# Patient Record
Sex: MALE | Race: WHITE | NOT HISPANIC OR LATINO
[De-identification: names, ages, dates, MRNs, and addresses within clinical notes are randomized per-mention and may not be internally consistent; named-entity substitution may affect disease eponyms.]

---

## 2017-06-13 ENCOUNTER — APPOINTMENT (OUTPATIENT)
Dept: ORTHOPEDIC SURGERY | Facility: CLINIC | Age: 67
End: 2017-06-13

## 2017-06-13 VITALS
WEIGHT: 170 LBS | SYSTOLIC BLOOD PRESSURE: 157 MMHG | HEIGHT: 69 IN | BODY MASS INDEX: 25.18 KG/M2 | DIASTOLIC BLOOD PRESSURE: 82 MMHG | HEART RATE: 60 BPM

## 2017-06-13 DIAGNOSIS — M79.671 PAIN IN RIGHT FOOT: ICD-10-CM

## 2017-06-13 DIAGNOSIS — Z87.891 PERSONAL HISTORY OF NICOTINE DEPENDENCE: ICD-10-CM

## 2017-06-13 DIAGNOSIS — Z86.79 PERSONAL HISTORY OF OTHER DISEASES OF THE CIRCULATORY SYSTEM: ICD-10-CM

## 2017-06-13 DIAGNOSIS — Z87.39 PERSONAL HISTORY OF OTHER DISEASES OF THE MUSCULOSKELETAL SYSTEM AND CONNECTIVE TISSUE: ICD-10-CM

## 2017-06-13 DIAGNOSIS — Z78.9 OTHER SPECIFIED HEALTH STATUS: ICD-10-CM

## 2019-11-22 ENCOUNTER — APPOINTMENT (OUTPATIENT)
Dept: NEUROLOGY | Facility: CLINIC | Age: 69
End: 2019-11-22

## 2019-12-10 ENCOUNTER — APPOINTMENT (OUTPATIENT)
Dept: OPHTHALMOLOGY | Facility: CLINIC | Age: 69
End: 2019-12-10

## 2019-12-12 ENCOUNTER — APPOINTMENT (OUTPATIENT)
Dept: OPHTHALMOLOGY | Facility: CLINIC | Age: 69
End: 2019-12-12
Payer: MEDICARE

## 2019-12-12 ENCOUNTER — NON-APPOINTMENT (OUTPATIENT)
Age: 69
End: 2019-12-12

## 2019-12-12 PROCEDURE — 92004 COMPRE OPH EXAM NEW PT 1/>: CPT

## 2019-12-12 PROCEDURE — 92225: CPT | Mod: RT

## 2019-12-19 ENCOUNTER — APPOINTMENT (OUTPATIENT)
Dept: OPHTHALMOLOGY | Facility: CLINIC | Age: 69
End: 2019-12-19
Payer: MEDICARE

## 2019-12-19 ENCOUNTER — NON-APPOINTMENT (OUTPATIENT)
Age: 69
End: 2019-12-19

## 2019-12-19 PROCEDURE — 92012 INTRM OPH EXAM EST PATIENT: CPT

## 2020-10-27 ENCOUNTER — APPOINTMENT (OUTPATIENT)
Dept: OPHTHALMOLOGY | Facility: CLINIC | Age: 70
End: 2020-10-27
Payer: MEDICARE

## 2020-10-27 ENCOUNTER — NON-APPOINTMENT (OUTPATIENT)
Age: 70
End: 2020-10-27

## 2020-10-27 PROCEDURE — 92012 INTRM OPH EXAM EST PATIENT: CPT

## 2021-04-01 ENCOUNTER — TRANSCRIPTION ENCOUNTER (OUTPATIENT)
Age: 71
End: 2021-04-01

## 2021-12-07 ENCOUNTER — TRANSCRIPTION ENCOUNTER (OUTPATIENT)
Age: 71
End: 2021-12-07

## 2021-12-26 ENCOUNTER — NON-APPOINTMENT (OUTPATIENT)
Age: 71
End: 2021-12-26

## 2021-12-27 ENCOUNTER — APPOINTMENT (OUTPATIENT)
Dept: ORTHOPEDIC SURGERY | Facility: CLINIC | Age: 71
End: 2021-12-27
Payer: MEDICARE

## 2021-12-27 VITALS
HEART RATE: 71 BPM | WEIGHT: 180 LBS | SYSTOLIC BLOOD PRESSURE: 154 MMHG | BODY MASS INDEX: 26.66 KG/M2 | HEIGHT: 69 IN | DIASTOLIC BLOOD PRESSURE: 82 MMHG

## 2021-12-27 DIAGNOSIS — M19.011 PRIMARY OSTEOARTHRITIS, RIGHT SHOULDER: ICD-10-CM

## 2021-12-27 DIAGNOSIS — M25.511 PAIN IN RIGHT SHOULDER: ICD-10-CM

## 2021-12-27 DIAGNOSIS — M19.012 PRIMARY OSTEOARTHRITIS, RIGHT SHOULDER: ICD-10-CM

## 2021-12-27 PROCEDURE — 73030 X-RAY EXAM OF SHOULDER: CPT | Mod: RT

## 2021-12-27 PROCEDURE — 99203 OFFICE O/P NEW LOW 30 MIN: CPT

## 2021-12-27 RX ORDER — ALFUZOSIN HYDROCHLORIDE 10 MG/1
10 TABLET, EXTENDED RELEASE ORAL
Qty: 90 | Refills: 0 | Status: ACTIVE | COMMUNITY
Start: 2021-08-26

## 2021-12-27 RX ORDER — VALSARTAN 320 MG/1
320 TABLET, COATED ORAL
Refills: 0 | Status: ACTIVE | COMMUNITY

## 2021-12-27 RX ORDER — ROSUVASTATIN CALCIUM 10 MG/1
10 TABLET, FILM COATED ORAL
Refills: 0 | Status: ACTIVE | COMMUNITY

## 2021-12-27 RX ORDER — ICOSAPENT ETHYL 1000 MG/1
1 CAPSULE ORAL
Qty: 360 | Refills: 0 | Status: ACTIVE | COMMUNITY
Start: 2021-08-30

## 2021-12-27 RX ORDER — ROSUVASTATIN CALCIUM 10 MG/1
10 TABLET, FILM COATED ORAL
Qty: 90 | Refills: 0 | Status: ACTIVE | COMMUNITY
Start: 2021-12-13

## 2021-12-27 NOTE — PHYSICAL EXAM
[Rad] : radial 2+ and symmetric bilaterally [Normal] : Alert and in no acute distress [Poor Appearance] : well-appearing [Acute Distress] : not in acute distress [Obese] : not obese [de-identified] : The patient has no respiratory distress. Mood and affect are normal. The patient is alert and oriented to person, place and time.\par Examination of the cervical spine demonstrates no tenderness, no deformity and no muscle spasm. Cervical spine rotation is 60° to the right, 60° to the left, 75° of extension and 45° of flexion. Neurologic exam of the upper extremities reveals intact sensation to light touch. Motor function is 5 over 5 in all groups. Deep tendon reflexes are 2+ and equal at the biceps, triceps and brachioradialis.\par Examination of the right shoulder demonstrates tenderness at the acromioclavicular joint.  There is no shoulder instability.  Drop arm test is negative.  Impingement is mildly positive.  Crossarm test is positive.  Belly press test is negative.  He has elevation of 130 degrees on the right compared to 150 on the left.  He has restriction of internal rotation.  The elbow is stable.  There is no lymphedema. [de-identified] : AP, transscapular and axillary x-rays of the right shoulder demonstrate no acute fracture or dislocation.  There are changes at the distal clavicle consistent with prior clavicle fracture which has healed.  There are degenerative changes at the AC joint.  There is a small calcification in the rotator cuff area.

## 2021-12-27 NOTE — DISCUSSION/SUMMARY
[de-identified] : The patient has pain from his right acromioclavicular joint.  I have discussed the pathology, natural history and treatment options with him.  He has degenerative changes with exacerbation.  He is referred for physical therapy.  He will take over-the-counter anti-inflammatories as needed.  Medication risks have been reviewed.  He will be reevaluated in 1 month.

## 2021-12-27 NOTE — HISTORY OF PRESENT ILLNESS
[de-identified] : This 71-year-old right-handed male presents for evaluation of 1 month of right shoulder pain.  He had no injury.  He notes that the pain is worse when he lies on his right side and when he moves his arm across his body.  He has had a history of neck surgery.  He is not having any neck pain.  He denies upper extremity numbness or tingling.  He has had a fracture of his right collarbone in the past.

## 2022-01-27 ENCOUNTER — APPOINTMENT (OUTPATIENT)
Dept: ORTHOPEDIC SURGERY | Facility: CLINIC | Age: 72
End: 2022-01-27
Payer: MEDICARE

## 2022-01-27 VITALS — BODY MASS INDEX: 26.66 KG/M2 | HEIGHT: 69 IN | WEIGHT: 180 LBS

## 2022-01-27 PROCEDURE — 99213 OFFICE O/P EST LOW 20 MIN: CPT

## 2022-01-27 NOTE — PHYSICAL EXAM
[Rad] : radial 2+ and symmetric bilaterally [Normal] : Alert and in no acute distress [Poor Appearance] : well-appearing [Acute Distress] : not in acute distress [Obese] : not obese [de-identified] : The patient has no respiratory distress. Mood and affect are normal. The patient is alert and oriented to person, place and time.\par Examination of the cervical spine demonstrates no tenderness, no deformity and no muscle spasm. Cervical spine rotation is 60° to the right, 60° to the left, 75° of extension and 45° of flexion. Neurologic exam of the upper extremities reveals intact sensation to light touch. Motor function is 5 over 5 in all groups. Deep tendon reflexes are 2+ and equal at the biceps, triceps and brachioradialis.\par Examination of the right shoulder demonstrates tenderness at the acromioclavicular joint.  There is no shoulder instability.  Drop arm test is negative.  Impingement is mildly positive.  Crossarm test is positive.  Belly press test is negative.  He has elevation of 135 degrees on the right compared to 150 on the left.  He has external rotation of 45 degrees right and 60 degrees left.  He has internal rotation to the lower lumbar level on the right and upper lumbar level on the left.  The elbow is stable.  There is no lymphedema.

## 2022-01-27 NOTE — HISTORY OF PRESENT ILLNESS
[de-identified] : 71 year old RHD male with right shoulder AC arthritis presents for reevaluation of right shoulder pain. He has completed 7 sessions of PT. The pain has diminished and his ROM has improved however he is still having discomfort especially with cross body motion. His arm feels weak. He occasionally takes Advil or Aspirin for pain.

## 2022-01-27 NOTE — DISCUSSION/SUMMARY
[de-identified] : The patient has continued right shoulder pain.  He has made some progress with physical therapy but still has some pain and stiffness.  He has mild external rotation weakness.  I recommend he continue his physical therapy program.  He will take ibuprofen as needed for pain.  He will be reevaluated after 1 month.

## 2022-02-20 ENCOUNTER — TRANSCRIPTION ENCOUNTER (OUTPATIENT)
Age: 72
End: 2022-02-20

## 2022-04-23 ENCOUNTER — TRANSCRIPTION ENCOUNTER (OUTPATIENT)
Age: 72
End: 2022-04-23

## 2022-07-24 ENCOUNTER — NON-APPOINTMENT (OUTPATIENT)
Age: 72
End: 2022-07-24

## 2023-04-14 ENCOUNTER — APPOINTMENT (OUTPATIENT)
Dept: ORTHOPEDIC SURGERY | Facility: CLINIC | Age: 73
End: 2023-04-14
Payer: MEDICARE

## 2023-04-14 VITALS
HEART RATE: 69 BPM | DIASTOLIC BLOOD PRESSURE: 81 MMHG | TEMPERATURE: 97.4 F | HEIGHT: 69 IN | OXYGEN SATURATION: 97 % | WEIGHT: 180 LBS | BODY MASS INDEX: 26.66 KG/M2 | SYSTOLIC BLOOD PRESSURE: 135 MMHG

## 2023-04-14 DIAGNOSIS — M19.011 PRIMARY OSTEOARTHRITIS, RIGHT SHOULDER: ICD-10-CM

## 2023-04-14 DIAGNOSIS — S40.011A CONTUSION OF RIGHT SHOULDER, INITIAL ENCOUNTER: ICD-10-CM

## 2023-04-14 DIAGNOSIS — M25.511 PAIN IN RIGHT SHOULDER: ICD-10-CM

## 2023-04-14 PROCEDURE — 73030 X-RAY EXAM OF SHOULDER: CPT | Mod: RT

## 2023-04-14 PROCEDURE — 99213 OFFICE O/P EST LOW 20 MIN: CPT

## 2024-01-26 ENCOUNTER — NON-APPOINTMENT (OUTPATIENT)
Age: 74
End: 2024-01-26

## 2024-01-27 ENCOUNTER — EMERGENCY (EMERGENCY)
Facility: HOSPITAL | Age: 74
LOS: 1 days | Discharge: LEFT BEFORE DISCHARGE | End: 2024-01-27
Attending: EMERGENCY MEDICINE
Payer: MEDICARE

## 2024-01-27 VITALS
OXYGEN SATURATION: 100 % | RESPIRATION RATE: 18 BRPM | WEIGHT: 179.9 LBS | TEMPERATURE: 98 F | DIASTOLIC BLOOD PRESSURE: 111 MMHG | HEIGHT: 68 IN | HEART RATE: 70 BPM | SYSTOLIC BLOOD PRESSURE: 143 MMHG

## 2024-01-27 PROCEDURE — 99285 EMERGENCY DEPT VISIT HI MDM: CPT

## 2024-01-27 NOTE — ED PROVIDER NOTE - ATTENDING CONTRIBUTION TO CARE
74 yo male presents after mechanical fall with L thumb injury.  endorses baby ASA use, no thinners.  x-rays independently interpreted by me with thumb dislocation.  ortho consulted, resident at the bedside reduced thumb.  CTs obtained given small nasal hematoma and on ASA; patient eloped from ED in the custody of his family pending CT results, which were read as normal.

## 2024-01-27 NOTE — ED PROVIDER NOTE - PHYSICAL EXAMINATION
GENERAL: Awake, alert, NAD  HEENT: blood in nares, left nare EOMI PEERLA   LUNGS: non labored breathing   ABDOMEN: Soft, non tender, non distended, no rebound, no guarding  EXT:obvious deformity to left thumb neurovasc intact otherwise ,  NEURO: A&Ox3. Moving all extremities.  SKIN: Warm and dry. No rash.  PSYCH: Normal affect.

## 2024-01-27 NOTE — ED PROVIDER NOTE - CARE PROVIDER_API CALL
Detail Level: Detailed Eva Mueller  Surgery of the Hand  [address], [address]  [place] [address]  Phone: [phone]  Fax: [phone]  Follow Up Time: 4-6 Days

## 2024-01-27 NOTE — ED PROVIDER NOTE - PROGRESS NOTE DETAILS
[facility] PGY 3 pt eloped w/o results back for ct head ambulated w/o assistance pt after multiple redirections stil did not want to wait for results rec multiples times to pt for him to stay.

## 2024-01-27 NOTE — ED PROVIDER NOTE - OBJECTIVE STATEMENT
73-year-old male history of A-fib status post cardioversion on aspirin chief complaint status post fall.  Patient consumed unknown amount of alcohol and fell for unknown reason.  Patient is right-hand dominant and had injury to his left thumb.  Patient cannot recall the exact because of injury he says the fact that he fell

## 2024-01-27 NOTE — ED PROVIDER NOTE - NSFOLLOWUPINSTRUCTIONS_ED_ALL_ED_FT
Today you were seen in the ED for a fall.     Please follow up with Dr. Mueller information for such can be found below.     It was found that you have No signs of medical emergency warranting further evaluation in the emergency department.    A copy of your results attached this document below.    Please follow up with Your primary care provider in regards to today's event.    SEEK IMMEDIATE MEDICAL CARE IF YOU HAVE ANY OF THE FOLLOWING SYMPTOMS: worsening shortness of breath, chest pain, back pain, abdominal pain, fever, coughing up blood, lightheadedness/dizziness.

## 2024-01-27 NOTE — ED PROVIDER NOTE - CLINICAL SUMMARY MEDICAL DECISION MAKING FREE TEXT BOX
given hx and pe concern for intracranial pathway given pt is intoxicated and probable fx vs dislocation of left thumb given appearance will assess w/ cts and xrays pt declined pain control given hx and pe concern for intracranial pathway given pt is intoxicated and probable fx vs dislocation of left thumb given appearance will assess w/ cts and xrays pt declined pain control    see attending attestation authored by me, Daniel Chew MD, for further MDM details.

## 2024-01-28 PROCEDURE — 70486 CT MAXILLOFACIAL W/O DYE: CPT | Mod: 26,MA

## 2024-01-28 PROCEDURE — 70450 CT HEAD/BRAIN W/O DYE: CPT | Mod: 26,MA

## 2024-01-28 PROCEDURE — 70450 CT HEAD/BRAIN W/O DYE: CPT | Mod: MA

## 2024-01-28 PROCEDURE — 26700 TREAT KNUCKLE DISLOCATION: CPT | Mod: FA

## 2024-01-28 PROCEDURE — 73130 X-RAY EXAM OF HAND: CPT

## 2024-01-28 PROCEDURE — 76377 3D RENDER W/INTRP POSTPROCES: CPT

## 2024-01-28 PROCEDURE — 70486 CT MAXILLOFACIAL W/O DYE: CPT | Mod: MA

## 2024-01-28 PROCEDURE — 72125 CT NECK SPINE W/O DYE: CPT | Mod: 26,MA

## 2024-01-28 PROCEDURE — 72125 CT NECK SPINE W/O DYE: CPT | Mod: MA

## 2024-01-28 PROCEDURE — 73130 X-RAY EXAM OF HAND: CPT | Mod: 26,LT

## 2024-01-28 PROCEDURE — 76377 3D RENDER W/INTRP POSTPROCES: CPT | Mod: 26

## 2024-01-28 PROCEDURE — 99285 EMERGENCY DEPT VISIT HI MDM: CPT | Mod: 25
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
## 2024-01-28 NOTE — ED ADULT NURSE NOTE - NS ED PATIENT SAFETY CONCERN
Problem: Patient Care Overview  Goal: Plan of Care Review  Outcome: Ongoing (interventions implemented as appropriate)   09/05/19 0564   Coping/Psychosocial   Plan of Care Reviewed With patient   Plan of Care Review   Progress no change   OTHER   Outcome Summary Pt has rested well throughout night. VSS on room air, no complaints of pain, A&O x4. Continuous bladder irrigation remains, return is light pink this AM, noticeably lighter than beginning of shift. Irrigation is darker with activity. Ambulates well with assist x1, walker. PT/OT to see pt today. Awaiting cystoscopy in next few days. No significant issues noted overnight, will continue to monitor.           No

## 2024-01-28 NOTE — ED ADULT NURSE REASSESSMENT NOTE - NS ED NURSE REASSESS COMMENT FT1
pt eloped - did not want to wait for CT results. wife at bedside and available for transport. MD José and MD Dewitt notified.

## 2024-01-28 NOTE — CONSULT NOTE ADULT - ASSESSMENT
74M with L thumb MCP dislocation    Plan:   Thumb reduced and splinted per above procedure note.   NWB L hand with alumifoam splint to block thumb MCP extension.   Pain management PRN  No acute orthopaedic surgical intervention indicated at this time. This patient is orthopaedically stable for discharge.   Patient to follow up with Dr. Mueller as an outpatient for further evaluation and management within one week.  All of the patient's questions and concerns were answered and addressed.   Will discuss with Dr. Mueller and will advise for any changes to the plan.    74M with L thumb MCP dislocation, now reduced    Plan:   Thumb reduced and splinted per above procedure note.   NWB L hand with alumifoam splint to block thumb MCP extension.   Pain management PRN  No acute orthopaedic surgical intervention indicated at this time. This patient is orthopaedically stable for discharge.   Patient to follow up with Dr. Mueller as an outpatient for further evaluation and management within one week.  All of the patient's questions and concerns were answered and addressed.   Will discuss with Dr. Mueller and will advise for any changes to the plan.

## 2024-01-28 NOTE — ED ADULT NURSE NOTE - NSFALLUNIVINTERV_ED_ALL_ED
Bed/Stretcher in lowest position, wheels locked, appropriate side rails in place/Call bell, personal items and telephone in reach/Instruct patient to call for assistance before getting out of bed/chair/stretcher/Non-slip footwear applied when patient is off stretcher/Turner to call system/Physically safe environment - no spills, clutter or unnecessary equipment/Purposeful proactive rounding/Room/bathroom lighting operational, light cord in reach

## 2024-01-29 ENCOUNTER — APPOINTMENT (OUTPATIENT)
Dept: ORTHOPEDIC SURGERY | Facility: CLINIC | Age: 74
End: 2024-01-29

## 2024-02-01 ENCOUNTER — APPOINTMENT (OUTPATIENT)
Dept: ORTHOPEDIC SURGERY | Facility: CLINIC | Age: 74
End: 2024-02-01
Payer: MEDICARE

## 2024-02-01 VITALS — HEIGHT: 69 IN | WEIGHT: 180 LBS | BODY MASS INDEX: 26.66 KG/M2

## 2024-02-01 DIAGNOSIS — S63.106A UNSPECIFIED DISLOCATION OF UNSPECIFIED THUMB, INITIAL ENCOUNTER: ICD-10-CM

## 2024-02-01 PROCEDURE — 99213 OFFICE O/P EST LOW 20 MIN: CPT

## 2024-02-01 PROCEDURE — 73140 X-RAY EXAM OF FINGER(S): CPT
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
## 2024-02-01 NOTE — PHYSICAL EXAM
[de-identified] :  Patient is WDWN, alert, and in no acute distress. Breathing is unlabored. He is grossly oriented to person, place, and time.  Left Hand: (thumb)   - tenderness is present - edema is present - sensation is intact  - limited ROM [de-identified] :  AP, lateral and oblique views of the Left Hand were obtained today and revealed Acute dorsal dislocation of the proximal and thumb metacarpal phalangal joint dislocation.  ACC: [id number] EXAM: XR HAND MIN 3 VIEWS LT ORDERED BY: BLAKE LIZ  PROCEDURE DATE: 01/28/2024    INTERPRETATION: The CLINICAL INDICATION: First digit injury status post fall  TECHNIQUE: 3 views of the left hand  COMPARISON: No similar prior comparisons available..  FINDINGS: Acute dorsal dislocation of the proximal and distal first phalanx at the first MCP joint, with adjacent soft tissue swelling.  Well-corticated ulnar styloid, separate from the distal ulna, may represent an acute fracture versus persistent ulnar styloid ossicle, correlate with point tenderness.  IMPRESSION: Acute dorsal dislocation of the first MCP joint with adjacent soft tissue swelling.  --- End of Report ---

## 2024-02-01 NOTE — END OF VISIT
[FreeTextEntry3] :  All medical record entries made by the Scribe were at my,  Dr. Ramon Mccarthy MD., direction and personally dictated by me on 02/01/2024. I have personally reviewed the chart and agree that the record accurately reflects my personal performance of the history, physical exam, assessment and plan.

## 2024-02-01 NOTE — ADDENDUM
[FreeTextEntry1] :  I, Guillermo Hillman wrote this note acting as a scribe for Dr. Ramon Mccarthy on Feb 01, 2024.

## 2024-02-01 NOTE — DISCUSSION/SUMMARY
[de-identified] :  The underlying pathophysiology was reviewed with the patient. XR films were reviewed with the patient. Discussed at length the nature of the patient's condition.   Patient was advised to take OTC medications and topical analgesic for pain management.  Patient was informed that the thumb will stay in place and it was advised that he don't fall casuing further injuries on that hand.   All questions answered, understanding verbalized. Patient in agreement with plan of care.   Patient was advised to follow up as needed.

## 2024-02-07 ENCOUNTER — NON-APPOINTMENT (OUTPATIENT)
Age: 74
End: 2024-02-07

## 2024-02-08 ENCOUNTER — APPOINTMENT (OUTPATIENT)
Dept: ORTHOPEDIC SURGERY | Facility: CLINIC | Age: 74
End: 2024-02-08
Payer: MEDICARE

## 2024-02-08 DIAGNOSIS — M79.89 OTHER SPECIFIED SOFT TISSUE DISORDERS: ICD-10-CM

## 2024-02-08 DIAGNOSIS — M79.672 PAIN IN LEFT FOOT: ICD-10-CM

## 2024-02-08 DIAGNOSIS — M62.462 CONTRACTURE OF MUSCLE, LEFT LOWER LEG: ICD-10-CM

## 2024-02-08 DIAGNOSIS — R93.7 ABNORMAL FINDINGS ON DIAGNOSTIC IMAGING OF OTHER PARTS OF MUSCULOSKELETAL SYSTEM: ICD-10-CM

## 2024-02-08 PROCEDURE — 99214 OFFICE O/P EST MOD 30 MIN: CPT

## 2024-02-08 PROCEDURE — 73630 X-RAY EXAM OF FOOT: CPT | Mod: LT

## 2024-02-08 PROCEDURE — 73600 X-RAY EXAM OF ANKLE: CPT | Mod: LT

## 2024-02-09 ENCOUNTER — RESULT REVIEW (OUTPATIENT)
Age: 74
End: 2024-02-09

## 2024-02-10 ENCOUNTER — APPOINTMENT (OUTPATIENT)
Dept: MRI IMAGING | Facility: CLINIC | Age: 74
End: 2024-02-10

## 2024-02-10 ENCOUNTER — OUTPATIENT (OUTPATIENT)
Dept: OUTPATIENT SERVICES | Facility: HOSPITAL | Age: 74
LOS: 1 days | End: 2024-02-10
Payer: MEDICARE

## 2024-02-10 DIAGNOSIS — R93.7 ABNORMAL FINDINGS ON DIAGNOSTIC IMAGING OF OTHER PARTS OF MUSCULOSKELETAL SYSTEM: ICD-10-CM

## 2024-02-10 PROCEDURE — A9585: CPT

## 2024-02-10 PROCEDURE — 73722 MRI JOINT OF LWR EXTR W/DYE: CPT

## 2024-02-27 ENCOUNTER — APPOINTMENT (OUTPATIENT)
Dept: ORTHOPEDIC SURGERY | Facility: CLINIC | Age: 74
End: 2024-02-27

## 2024-02-29 ENCOUNTER — APPOINTMENT (OUTPATIENT)
Dept: ORTHOPEDIC SURGERY | Facility: CLINIC | Age: 74
End: 2024-02-29
Payer: MEDICARE

## 2024-02-29 PROCEDURE — 99214 OFFICE O/P EST MOD 30 MIN: CPT

## 2024-03-07 NOTE — DISCUSSION/SUMMARY
[Surgical risks reviewed] : Surgical risks reviewed [All Questions Answered] : Patient (and family) had all questions answered to an agreeable level of satisfaction [Interested in Proceeding] : Patient (and family) expressed understanding and interest in proceeding with the plan as outlined [de-identified] : It is instructed that this is new according to the patient as it is calcified.  Usually takes some time for patients to become calcified.  In any event I would recommend him for biopsy/resection.  This will go down to the calcaneus.  There is some stress in the calcaneus which could be related to some rubbing versus something else coming from the bone.  In any event I think biopsy as well as resection would be appropriate.  He understands there is a chance of malignancy as well as a chance that he may need further surgery including possible partial calcanectomy with graft should there be high-grade malignancy.  While this is not likely this is a possibility.  Follow-up again for surgery.  If imaging or pathology/biopsy was ordered, the patient was told to make an appointment to review findings right after all imaging is completed.  We discussed risks, benefits and alternatives. Rationale of care was reviewed and all questions were answered. Patient (and family) had all questions answered to her degree of the level of satisfaction. Patient (and family) expressed understanding and interest in proceeding with the plan as outlined.     This note was done with a voice recognition transcription software and any typos are related to this rather than medical error. Surgical risks reviewed. Patient (and family) had all questions answered to an agreeable level of satisfaction. Patient (and family) expressed understanding and interest in proceeding with the plan as outlined.

## 2024-03-07 NOTE — HISTORY OF PRESENT ILLNESS
[FreeTextEntry1] : This 74-year-old gentleman who started complaining left foot pain in late January.  He had an running a significant amount and then started complaining of pain in the lateral aspect of his left hindfoot and ankle.  Remember any significant trauma.  The pain eventually got better however he still found that there was a mass in the area.  He went to the foot and ankle surgeon who eventually had MRI scan and that was sent to me. [Stable] : stable [0] : currently ~his/her~ pain is 0 out of 10

## 2024-03-07 NOTE — DATA REVIEWED
[de-identified] : MR Ankle 2/10/2024 -  IMPRESSION: Lesion within the subcutaneous tissues along the posterior lateral calcaneus corresponding to radiographic finding. Lesion contacts the lateral cortex of the calcaneus. Lesion is favored to be centered in the subcutaneous tissues rather than arising from the bone. Consider dedicated axial radiographs of the calcaneus versus CT to assess the lesion bone interface. There is surrounding soft tissue edema and enhancement as well as reactive edema and enhancement within the adjacent posterior lateral calcaneus marrow and within the surrounding soft tissues. Findings are nonspecific of which differential consideration includes but is not exclusive to hydroxyapatite deposition given surrounding inflammatory changes though an atypical location versus tumoral calcinosis, though also an atypical location. Calcified panniculitis/old hematoma are differential considerations though not expected to have extensive surrounding inflammatory change. Age and location is atypical for a calcifying aponeurotic fibroma.  X-rays done February 2024 shows mild plantar calcaneal enthesophyte as well as calcifications coming up against the lateral calcaneum of approximately 1 cm. [Imaging Present] : Present

## 2024-03-07 NOTE — PHYSICAL EXAM
[General Appearance - Well-Appearing] : Well appearing [FreeTextEntry1] : On exam patient stands in good balance.  Is a hard mass right in the area of concern.  This is up against the lateral portion of the calcaneus.  He is unable to move it.  He is able to otherwise move the ankle foot without any problems.  He has tenderness only in this area.  He does not think that it was there prior to January.  Thinks it grew but then has been stable for some time in the past few weeks.  There is no Tinel's thrills or bruits.  He has no posterior tibial popliteal or inguinal lymphadenopathy.  He is otherwise neurovascular intact. [General Appearance - Well Nourished] : well nourished [Oriented To Time, Place, And Person] : Oriented to person, place, and time [Sclera] : the sclera and conjunctiva were normal [Neck Cervical Mass (___cm)] : no neck mass was observed [] : No respiratory distress [Heart Rate And Rhythm] : heart rate was normal and rhythm regular [Abdomen Soft] : Soft [Normal Station and Gait] : gait and station were normal [Ecchymosis] : no ecchymosis [Tenderness] : tenderness [Masses] : masses [Skin Changes - Describe changes:] : No skin changes noted [Full ROM Unless otherwise noted:] : Full range of motion unless otherwise noted: [LE  Motor Strength Normal unless otherwise noted:] : 5/5 strength in bilateral lower extemities unless otherwise noted. [Normal] : Sensation intact to light touch.

## 2024-03-22 ENCOUNTER — OUTPATIENT (OUTPATIENT)
Dept: OUTPATIENT SERVICES | Facility: HOSPITAL | Age: 74
LOS: 1 days | End: 2024-03-22

## 2024-03-22 VITALS
OXYGEN SATURATION: 99 % | SYSTOLIC BLOOD PRESSURE: 142 MMHG | HEIGHT: 68 IN | TEMPERATURE: 98 F | HEART RATE: 74 BPM | DIASTOLIC BLOOD PRESSURE: 82 MMHG | WEIGHT: 184.09 LBS | RESPIRATION RATE: 16 BRPM

## 2024-03-22 DIAGNOSIS — M79.89 OTHER SPECIFIED SOFT TISSUE DISORDERS: ICD-10-CM

## 2024-03-22 DIAGNOSIS — Z87.898 PERSONAL HISTORY OF OTHER SPECIFIED CONDITIONS: ICD-10-CM

## 2024-03-22 DIAGNOSIS — M79.672 PAIN IN LEFT FOOT: ICD-10-CM

## 2024-03-22 DIAGNOSIS — Z98.890 OTHER SPECIFIED POSTPROCEDURAL STATES: Chronic | ICD-10-CM

## 2024-03-22 DIAGNOSIS — I10 ESSENTIAL (PRIMARY) HYPERTENSION: ICD-10-CM

## 2024-03-22 RX ORDER — SODIUM CHLORIDE 9 MG/ML
1000 INJECTION, SOLUTION INTRAVENOUS
Refills: 0 | Status: DISCONTINUED | OUTPATIENT
Start: 2024-03-28 | End: 2024-04-11

## 2024-03-22 NOTE — H&P PST ADULT - MUSCULOSKELETAL COMMENTS
Hx left heel lump with pain - pain now reduced - Left heel lump  - firm - mild pain with pressure Hx left heel lump with pain - pain now reduced but swelling still present

## 2024-03-22 NOTE — H&P PST ADULT - ATTENDING COMMENTS
I have reviewed and agree with note as written above  for biopsy resection left foot mass  Risks, benefits and alternatives discussed with patient.  Roosevelt Mcgregor MD  Musculoskeletal Oncology  [phone]

## 2024-03-22 NOTE — H&P PST ADULT - NSANTHOSAYNRD_GEN_A_CORE
No. GIUSEPPE screening performed.  STOP BANG Legend: 0-2 = LOW Risk; 3-4 = INTERMEDIATE Risk; 5-8 = HIGH Risk

## 2024-03-22 NOTE — H&P PST ADULT - NSICDXPASTSURGICALHX_GEN_ALL_CORE_FT
PAST SURGICAL HISTORY:  H/O cervical spine surgery     History of hernia surgery     S/P AV dalia ablation

## 2024-03-22 NOTE — H&P PST ADULT - NSICDXPASTMEDICALHX_GEN_ALL_CORE_FT
PAST MEDICAL HISTORY:  A-fib     H/O cervical spine surgery     History of BPH     HLD (hyperlipidemia)     HTN (hypertension)     Other disorders of soft tissue     Smoking history

## 2024-03-27 ENCOUNTER — TRANSCRIPTION ENCOUNTER (OUTPATIENT)
Age: 74
End: 2024-03-27

## 2024-03-27 PROBLEM — Z98.890 OTHER SPECIFIED POSTPROCEDURAL STATES: Chronic | Status: ACTIVE | Noted: 2024-03-22

## 2024-03-27 PROBLEM — I10 ESSENTIAL (PRIMARY) HYPERTENSION: Chronic | Status: ACTIVE | Noted: 2024-03-22

## 2024-03-27 PROBLEM — Z87.891 PERSONAL HISTORY OF NICOTINE DEPENDENCE: Chronic | Status: ACTIVE | Noted: 2024-03-22

## 2024-03-27 PROBLEM — E78.5 HYPERLIPIDEMIA, UNSPECIFIED: Chronic | Status: ACTIVE | Noted: 2024-03-22

## 2024-03-27 PROBLEM — M79.89 OTHER SPECIFIED SOFT TISSUE DISORDERS: Chronic | Status: ACTIVE | Noted: 2024-03-22

## 2024-03-27 PROBLEM — Z87.438 PERSONAL HISTORY OF OTHER DISEASES OF MALE GENITAL ORGANS: Chronic | Status: ACTIVE | Noted: 2024-03-22

## 2024-03-27 PROBLEM — I48.91 UNSPECIFIED ATRIAL FIBRILLATION: Chronic | Status: ACTIVE | Noted: 2024-03-22

## 2024-03-27 NOTE — ASU PATIENT PROFILE, ADULT - FALL HARM RISK - RISK INTERVENTIONS

## 2024-03-28 ENCOUNTER — OUTPATIENT (OUTPATIENT)
Dept: OUTPATIENT SERVICES | Facility: HOSPITAL | Age: 74
LOS: 1 days | Discharge: HOME | End: 2024-03-28
Payer: MEDICARE

## 2024-03-28 ENCOUNTER — TRANSCRIPTION ENCOUNTER (OUTPATIENT)
Age: 74
End: 2024-03-28

## 2024-03-28 ENCOUNTER — APPOINTMENT (OUTPATIENT)
Dept: ORTHOPEDIC SURGERY | Facility: HOSPITAL | Age: 74
End: 2024-03-28

## 2024-03-28 ENCOUNTER — RESULT REVIEW (OUTPATIENT)
Age: 74
End: 2024-03-28

## 2024-03-28 VITALS
SYSTOLIC BLOOD PRESSURE: 150 MMHG | DIASTOLIC BLOOD PRESSURE: 78 MMHG | HEART RATE: 60 BPM | RESPIRATION RATE: 14 BRPM | OXYGEN SATURATION: 99 %

## 2024-03-28 VITALS
WEIGHT: 184.09 LBS | TEMPERATURE: 98 F | DIASTOLIC BLOOD PRESSURE: 77 MMHG | OXYGEN SATURATION: 96 % | RESPIRATION RATE: 16 BRPM | HEART RATE: 64 BPM | SYSTOLIC BLOOD PRESSURE: 147 MMHG | HEIGHT: 68 IN

## 2024-03-28 DIAGNOSIS — M79.89 OTHER SPECIFIED SOFT TISSUE DISORDERS: ICD-10-CM

## 2024-03-28 DIAGNOSIS — Z98.890 OTHER SPECIFIED POSTPROCEDURAL STATES: Chronic | ICD-10-CM

## 2024-03-28 PROCEDURE — 88305 TISSUE EXAM BY PATHOLOGIST: CPT | Mod: 26

## 2024-03-28 PROCEDURE — 28046 RESECT FOOT/TOE TUMOR < 3 CM: CPT | Mod: LT

## 2024-03-28 PROCEDURE — 88311 DECALCIFY TISSUE: CPT | Mod: 26

## 2024-03-28 RX ORDER — ASPIRIN/CALCIUM CARB/MAGNESIUM 324 MG
1 TABLET ORAL
Refills: 0 | DISCHARGE

## 2024-03-28 RX ORDER — ALFUZOSIN HYDROCHLORIDE 10 MG/1
1 TABLET, EXTENDED RELEASE ORAL
Refills: 0 | DISCHARGE

## 2024-03-28 RX ORDER — VALSARTAN 80 MG/1
1 TABLET ORAL
Refills: 0 | DISCHARGE

## 2024-03-28 RX ORDER — OXYCODONE HYDROCHLORIDE 5 MG/1
5 TABLET ORAL ONCE
Refills: 0 | Status: DISCONTINUED | OUTPATIENT
Start: 2024-03-28 | End: 2024-03-28

## 2024-03-28 RX ORDER — FENTANYL CITRATE 50 UG/ML
50 INJECTION INTRAVENOUS ONCE
Refills: 0 | Status: DISCONTINUED | OUTPATIENT
Start: 2024-03-28 | End: 2024-03-28

## 2024-03-28 RX ORDER — ICOSAPENT ETHYL 500 MG/1
2 CAPSULE, LIQUID FILLED ORAL
Refills: 0 | DISCHARGE

## 2024-03-28 RX ORDER — ROSUVASTATIN CALCIUM 5 MG/1
1 TABLET ORAL
Refills: 0 | DISCHARGE

## 2024-03-28 RX ORDER — ACETAMINOPHEN 500 MG
975 TABLET ORAL ONCE
Refills: 0 | Status: COMPLETED | OUTPATIENT
Start: 2024-03-28 | End: 2024-03-28

## 2024-03-28 RX ADMIN — Medication 975 MILLIGRAM(S): at 07:23

## 2024-03-28 NOTE — ASU PREOP CHECKLIST - NS PREOP CHK MONITOR ANESTHESIA CONSENT
done

## 2024-03-28 NOTE — ASU DISCHARGE PLAN (ADULT/PEDIATRIC) - NURSING INSTRUCTIONS
DO NOT take any Tylenol (Acetaminophen) or narcotics containing Tylenol until after  1:25pm______ . You received Tylenol during your operation and it can cause damage to your liver if too much is taken within a 24 hour time period.

## 2024-03-28 NOTE — ASU DISCHARGE PLAN (ADULT/PEDIATRIC) - ASU DC SPECIAL INSTRUCTIONSFT
WOUND CARE: Do not remove dressing until follow up. Keep dressing/incision clean, dry, and intact.  BATHING: Please do not submerge wound underwater. You may shower and/or sponge bathe. Do not scrub incisions or apply lotions.  ACTIVITY: Your weight bearing status is **. If you are taking narcotic pain medication (such as Percocet), do NOT drive a car, operate machinery or make important decisions.  DIET: Return to your usual diet.  NOTIFY YOUR SURGEON IF: You have any bleeding that does not stop, any pus draining from your wound, any fever (over 100.4 F) or chills, persistent nausea/vomiting, persistent diarrhea, or if your pain is not controlled on your discharge pain medications.  PAIN CONTROL: Please take medication as prescribed. If you have been prescribed a narcotic (such as Oxycodone), please be aware that narcotic pain medicine can cause extreme nausea and constipation. Drink plenty of water and take diuretics (colace, Miralax) as needed. You can get them from your local pharmacy. If you have been prescribed a narcotic (such as Oxycodone), please take for severe pain only. You can take up to 8 Tylenol 325 mg a day while taking Percocet. Alternate between taking over the counter Ibuprofen and Tylenol so you are taking pain medication every 3-4 hours if your pain is severe.   FOLLOW-UP:  1. Follow-up with Dr. Mcgregor within 1-2 weeks of discharge.  Please call office for appointment WOUND CARE: Do not remove dressing until follow up. Keep dressing/incision clean, dry, and intact.  BATHING: Please do not submerge wound underwater. You may shower and/or sponge bathe. Do not scrub incisions or apply lotions.  ACTIVITY: Your weight bearing status is weightbearing as tolerated in post-op shoe. Please wear shoe if you are up and walking. If you are taking narcotic pain medication (such as Percocet), do NOT drive a car, operate machinery or make important decisions.  DIET: Return to your usual diet.  NOTIFY YOUR SURGEON IF: You have any bleeding that does not stop, any pus draining from your wound, any fever (over 100.4 F) or chills, persistent nausea/vomiting, persistent diarrhea, or if your pain is not controlled on your discharge pain medications.  PAIN CONTROL: Please take medication as prescribed. If you have been prescribed a narcotic (such as Oxycodone), please be aware that narcotic pain medicine can cause extreme nausea and constipation. Drink plenty of water and take diuretics (colace, Miralax) as needed. You can get them from your local pharmacy. If you have been prescribed a narcotic (such as Oxycodone), please take for severe pain only.  Alternate between taking over the counter Ibuprofen and Tylenol so you are taking pain medication every 3-4 hours if your pain is severe.   FOLLOW-UP:  1. Follow-up with Dr. Mcgregor within 1-2 weeks of discharge.  Please call office for appointment

## 2024-03-28 NOTE — ASU DISCHARGE PLAN (ADULT/PEDIATRIC) - CARE PROVIDER_API CALL
Roosevelt Mcgregor  Orthopaedic Surgery  [address]  [place] [address]  Phone: [phone]  Fax: [phone]  Follow Up Time:

## 2024-04-10 ENCOUNTER — APPOINTMENT (OUTPATIENT)
Dept: ORTHOPEDIC SURGERY | Facility: CLINIC | Age: 74
End: 2024-04-10
Payer: MEDICARE

## 2024-04-10 VITALS
DIASTOLIC BLOOD PRESSURE: 81 MMHG | SYSTOLIC BLOOD PRESSURE: 135 MMHG | BODY MASS INDEX: 26.66 KG/M2 | OXYGEN SATURATION: 98 % | WEIGHT: 180 LBS | HEIGHT: 69 IN | HEART RATE: 78 BPM

## 2024-04-10 PROCEDURE — 99024 POSTOP FOLLOW-UP VISIT: CPT

## 2024-05-08 NOTE — HISTORY OF PRESENT ILLNESS
[___ Weeks Post Op] : [unfilled] weeks post op [1] : the patient reports pain that is 1/10 in severity [de-identified] : 3/28/2024 - Resection left foot mass [de-identified] : Patient is feeling Very well at this point.  He has no complaints.  He wants to go back to running. [de-identified] : On exam incision is clean dry and intact.  He is able to move everything without any complaints.  He has no numbness or tingling. 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  This is dystrophic calcification possibly trauma related however it is not neoplastic in nature. [de-identified] : I discussed with the patient that there is no actual tumor and hopefully the resection we did will be curative.  He should be able to go back to regular activity within a few more weeks.  I can see him back again should there be any wound problems otherwise he can get back to regular activity.  If imaging or pathology/biopsy was ordered, the patient was told to make an appointment to review findings right after all imaging is completed.  We discussed risks, benefits and alternatives. Rationale of care was reviewed and all questions were answered. Patient (and family) had all questions answered to her degree of the level of satisfaction. Patient (and family) expressed understanding and interest in proceeding with the plan as outlined.     This note was done with a voice recognition transcription software and any typos are related to this rather than medical error. Surgical risks reviewed. Patient (and family) had all questions answered to an agreeable level of satisfaction. Patient (and family) expressed understanding and interest in proceeding with the plan as outlined.

## 2024-07-03 ENCOUNTER — NON-APPOINTMENT (OUTPATIENT)
Age: 74
End: 2024-07-03

## 2024-07-03 ENCOUNTER — APPOINTMENT (OUTPATIENT)
Dept: OPHTHALMOLOGY | Facility: CLINIC | Age: 74
End: 2024-07-03
Payer: MEDICARE

## 2024-07-03 PROCEDURE — 76514 ECHO EXAM OF EYE THICKNESS: CPT

## 2024-07-03 PROCEDURE — 92133 CPTRZD OPH DX IMG PST SGM ON: CPT

## 2024-07-03 PROCEDURE — 92004 COMPRE OPH EXAM NEW PT 1/>: CPT

## 2024-07-19 ENCOUNTER — EMERGENCY (EMERGENCY)
Facility: HOSPITAL | Age: 74
LOS: 1 days | Discharge: LEFT BEFORE DISCHARGE | End: 2024-07-19
Attending: STUDENT IN AN ORGANIZED HEALTH CARE EDUCATION/TRAINING PROGRAM
Payer: MEDICARE

## 2024-07-19 VITALS
RESPIRATION RATE: 18 BRPM | WEIGHT: 175.05 LBS | OXYGEN SATURATION: 98 % | SYSTOLIC BLOOD PRESSURE: 133 MMHG | HEART RATE: 76 BPM | HEIGHT: 69 IN | TEMPERATURE: 98 F | DIASTOLIC BLOOD PRESSURE: 91 MMHG

## 2024-07-19 DIAGNOSIS — Z98.890 OTHER SPECIFIED POSTPROCEDURAL STATES: Chronic | ICD-10-CM

## 2024-07-19 PROCEDURE — 99283 EMERGENCY DEPT VISIT LOW MDM: CPT | Mod: GC

## 2024-07-19 PROCEDURE — 99282 EMERGENCY DEPT VISIT SF MDM: CPT

## 2024-07-19 NOTE — ED ADULT TRIAGE NOTE - CHIEF COMPLAINT QUOTE
slipped and fell on tile floor while dancing at a wedding tonight in [place]; c/o pain to right knee; unable to bear weight; no LOC; on daily baby asa

## 2024-07-20 NOTE — ED PROVIDER NOTE - PHYSICAL EXAMINATION
GENERAL: no acute distress, non-toxic appearing  HEAD: normocephalic, atraumatic  HEENT: oral mucosa moist, full ROM of neck  CARDIAC: regular rate rhythm, normal S1/S2  CHEST: CTA BL, no wheeze or crackles  ABDOMEN: normal BS, soft, no tenderness  EXTREMITY: No midline spinal tenderness throughout. No visible deformities noted in the right lower extremity.  Extremity is neurovascularly intact. There is a soft "divot" right above the patella.  Full passive ROM.  Limited active range of motion.   NEURO: alert and orientedx3

## 2024-07-20 NOTE — ED PROVIDER NOTE - ATTENDING CONTRIBUTION TO CARE
Sammy Shoemaker MD (Attending Physician):    I performed a history and physical exam of the patient and discussed their management with the resident/fellow/ACP/student. I have reviewed the resident/fellow/ACP/student note and agree with the documented findings and plan of care, except as noted. I have personally performed a substantive portion of the visit including all aspects of the medical decision making. My medical decision making and observations are found below. Please refer to any progress notes for updates on clinical course.    HPI:  74-year-old male with pmhx of hypertension, cervical spine surgery, BPH, smoking, HLD, Afib, AV dalia ablation, hernia surgery p/w right knee pain after a fall. Patient was dancing at a wedding, tripped and fell on his buttocks, simultaneously felt his right lower extremity twist. He was unable to walk after the event. C/o pain to right knee, unable to bear weight. Denies head strike or LOC. Denies injury to any other part of his body. On daily baby ASA. Denies any symptoms prior to the fall. Says that he is able to passively range the R knee, but not actively.    PE:  GEN - NAD, well appearing, A&Ox3  HEAD - NC/AT  EYES - PERRL, EOMI  ENT - Airway patent, mucous membranes moist  NECK - Supple, non-tender without lymphadenopathy, no masses  PULMONARY - CTA b/l, symmetric breath sounds, no W/R/R  CARDIAC - +S1S2, RRR, no M/G/R, no JVD  BACK - No midline TTP  EXTREMITIES - +No visible deformities noted in the right knee.  2+ DP pulses b/l. Normal sensation to b/l LE. Soft "divot" right above the patella that is mildly TTP. Full passive ROM of R knee. Limited active range of motion of R knee.  SKIN - No rash or bruising  NEUROLOGIC - Alert, speech clear, no focal deficits  PSYCH - Normal mood/affect, normal insight    MDM:  DDx includes, but not limited to: R knee fracture, R knee ligamentous injury, R knee sprain. Pt doesn't want any pain meds at this time. X-ray R knee. Most likely DC home with outpatient ortho f/u for MRI.

## 2024-07-20 NOTE — ED ADULT NURSE REASSESSMENT NOTE - NS ED NURSE REASSESS COMMENT FT1
Pt states he is anxious and doesn't want to wait for the X-ray. Pt attempting to stand up almost falling over. Pt educated as to the need of imaging, however pt got into wheelchair and rolled out by wife. Charge nurse/KELLY Wyatt made aware. Statement Selected

## 2024-07-20 NOTE — ED ADULT NURSE NOTE - OBJECTIVE STATEMENT
75 y/o M presents to the ED with complaints of fall. Pt states that he was dancing at a wedding when he jumped to the song. Pt reports that he had not "landed" correctly when coming down. pt denies LOC, head strike, numbness and tingling. Pt A&Ox3 gross neuro intact, no difficulty speaking in complete sentences, demarco x4. wife at bedside

## 2024-09-06 NOTE — ASU PATIENT PROFILE, ADULT - NSICDXPASTSURGICALHX_GEN_ALL_CORE_FT
Statement Selected
PAST SURGICAL HISTORY:  H/O cervical spine surgery     History of hernia surgery     S/P AV dalia ablation

## 2024-09-25 ENCOUNTER — APPOINTMENT (OUTPATIENT)
Dept: OPHTHALMOLOGY | Facility: CLINIC | Age: 74
End: 2024-09-25

## 2025-01-07 ENCOUNTER — APPOINTMENT (OUTPATIENT)
Dept: OPHTHALMOLOGY | Facility: CLINIC | Age: 75
End: 2025-01-07
Payer: MEDICARE

## 2025-01-07 ENCOUNTER — NON-APPOINTMENT (OUTPATIENT)
Age: 75
End: 2025-01-07

## 2025-01-07 PROCEDURE — 92133 CPTRZD OPH DX IMG PST SGM ON: CPT

## 2025-01-07 PROCEDURE — 92014 COMPRE OPH EXAM EST PT 1/>: CPT

## 2025-01-07 PROCEDURE — 92083 EXTENDED VISUAL FIELD XM: CPT

## 2025-02-07 ENCOUNTER — APPOINTMENT (OUTPATIENT)
Dept: OPHTHALMOLOGY | Facility: CLINIC | Age: 75
End: 2025-02-07

## 2025-07-08 ENCOUNTER — APPOINTMENT (OUTPATIENT)
Dept: OPHTHALMOLOGY | Facility: CLINIC | Age: 75
End: 2025-07-08

## 2025-07-23 ENCOUNTER — NON-APPOINTMENT (OUTPATIENT)
Age: 75
End: 2025-07-23

## 2025-07-23 ENCOUNTER — APPOINTMENT (OUTPATIENT)
Dept: OPHTHALMOLOGY | Facility: CLINIC | Age: 75
End: 2025-07-23
Payer: MEDICARE

## 2025-07-23 PROCEDURE — 92133 CPTRZD OPH DX IMG PST SGM ON: CPT

## 2025-07-23 PROCEDURE — 92083 EXTENDED VISUAL FIELD XM: CPT

## 2025-07-23 PROCEDURE — 92014 COMPRE OPH EXAM EST PT 1/>: CPT

## 2025-08-07 ENCOUNTER — APPOINTMENT (OUTPATIENT)
Dept: SURGERY | Facility: CLINIC | Age: 75
End: 2025-08-07
Payer: MEDICARE

## 2025-08-07 VITALS
DIASTOLIC BLOOD PRESSURE: 82 MMHG | HEART RATE: 64 BPM | RESPIRATION RATE: 16 BRPM | SYSTOLIC BLOOD PRESSURE: 134 MMHG | WEIGHT: 175 LBS | TEMPERATURE: 97.7 F | OXYGEN SATURATION: 99 % | HEIGHT: 69 IN | BODY MASS INDEX: 25.92 KG/M2

## 2025-08-07 DIAGNOSIS — K40.91 UNILATERAL INGUINAL HERNIA, W/OUT OBSTRUCTION OR GANGRENE, RECURRENT: ICD-10-CM

## 2025-08-07 DIAGNOSIS — Z87.19 PERSONAL HISTORY OF OTHER DISEASES OF THE DIGESTIVE SYSTEM: ICD-10-CM

## 2025-08-07 PROCEDURE — 99203 OFFICE O/P NEW LOW 30 MIN: CPT

## 2025-09-15 ENCOUNTER — APPOINTMENT (OUTPATIENT)
Dept: OPHTHALMOLOGY | Facility: CLINIC | Age: 75
End: 2025-09-15

## (undated) DEVICE — TAPE SILK 3"

## (undated) DEVICE — DRAPE COVER SNAP 36X30"

## (undated) DEVICE — DRSG STOCKINETTE IMPERVIOUS XL

## (undated) DEVICE — SUT VICRYL 0 27" OS-6 UNDYED

## (undated) DEVICE — DRSG XEROFORM 5 X 9"

## (undated) DEVICE — DRAPE 3/4 SHEET 52X76"

## (undated) DEVICE — ELCTR BOVIE TIP NEEDLE INSULATED 2.8" EDGE

## (undated) DEVICE — VENODYNE/SCD SLEEVE CALF MEDIUM

## (undated) DEVICE — POSITIONER STRAP ARMBOARD VELCRO TS-30

## (undated) DEVICE — PROTECTOR HEEL / ELBOW FLUFFY

## (undated) DEVICE — DRSG COBAN 4"

## (undated) DEVICE — PACK LIJ BASIC ORTHO

## (undated) DEVICE — CONNECTOR 5 IN1 SUCTION TUBING

## (undated) DEVICE — TOURNIQUET ESMARK 6"

## (undated) DEVICE — TRAP SPECIMEN SPUTUM 40CC

## (undated) DEVICE — DRSG COBAN COMPRESSION SYSTEM 2 LAYER

## (undated) DEVICE — DRAPE U POLY BLUE 60"X60"

## (undated) DEVICE — WARMING BLANKET LOWER ADULT

## (undated) DEVICE — GLV 8 PROTEXIS (CREAM) MICRO

## (undated) DEVICE — DRSG TELFA 3 X 8

## (undated) DEVICE — SUT VICRYL 2-0 27" CP-1 UNDYED

## (undated) DEVICE — SUT ETHILON 2-0 18" FS

## (undated) DEVICE — DRAPE IOBAN 23" X 23"

## (undated) DEVICE — ELCTR BOVIE PENCIL SMOKE EVACUATION

## (undated) DEVICE — PREP CHLORAPREP HI-LITE ORANGE 26ML

## (undated) DEVICE — SHOE POST OP NYLON MALE LG